# Patient Record
Sex: MALE | Race: WHITE | NOT HISPANIC OR LATINO | Employment: OTHER | URBAN - METROPOLITAN AREA
[De-identification: names, ages, dates, MRNs, and addresses within clinical notes are randomized per-mention and may not be internally consistent; named-entity substitution may affect disease eponyms.]

---

## 2017-03-01 ENCOUNTER — PATIENT OUTREACH (OUTPATIENT)
Dept: HEALTH INFORMATION MANAGEMENT | Facility: OTHER | Age: 67
End: 2017-03-01

## 2017-03-14 NOTE — PROGRESS NOTES
Attempt #:3    Verify PCP: yes    Communication Preference Obtained: no     Annual Wellness Visit Scheduling  1. Scheduling Status:Not Scheduled. Patient states they moved out of the area                MyChart Activation: declined  [employer] Estephanie: no  Virtual Visits: no  Opt In to Text Messages: no

## 2018-08-27 PROBLEM — R73.09 ELEVATED GLUCOSE: Status: ACTIVE | Noted: 2018-08-27

## 2021-01-15 DIAGNOSIS — Z23 NEED FOR VACCINATION: ICD-10-CM

## 2021-03-25 ENCOUNTER — PATIENT OUTREACH (OUTPATIENT)
Dept: HEALTH INFORMATION MANAGEMENT | Facility: OTHER | Age: 71
End: 2021-03-25

## 2021-03-25 NOTE — PROGRESS NOTES
Called patient to schedule for AWV.    Outcome:   Left Message Please transfer to Patient Outreach Team at [phone] when patient returns call.     Attempt # 1  -[id number]

## 2021-03-31 NOTE — PROGRESS NOTES
Outcome: Left Message - [facility] AWV    Please transfer to Patient Outreach Team at [phone] when patient returns call.    Attempt # 2